# Patient Record
Sex: MALE | Race: BLACK OR AFRICAN AMERICAN | NOT HISPANIC OR LATINO | URBAN - METROPOLITAN AREA
[De-identification: names, ages, dates, MRNs, and addresses within clinical notes are randomized per-mention and may not be internally consistent; named-entity substitution may affect disease eponyms.]

---

## 2024-06-26 ENCOUNTER — HOSPITAL ENCOUNTER (EMERGENCY)
Facility: HOSPITAL | Age: 29
Discharge: HOME | End: 2024-06-26
Payer: COMMERCIAL

## 2024-06-26 VITALS
HEART RATE: 88 BPM | DIASTOLIC BLOOD PRESSURE: 80 MMHG | BODY MASS INDEX: 30.31 KG/M2 | OXYGEN SATURATION: 97 % | TEMPERATURE: 97.8 F | HEIGHT: 68 IN | SYSTOLIC BLOOD PRESSURE: 120 MMHG | WEIGHT: 200 LBS | RESPIRATION RATE: 17 BRPM

## 2024-06-26 DIAGNOSIS — M54.6 ACUTE RIGHT-SIDED THORACIC BACK PAIN: Primary | ICD-10-CM

## 2024-06-26 PROCEDURE — 99283 EMERGENCY DEPT VISIT LOW MDM: CPT

## 2024-06-26 PROCEDURE — 96372 THER/PROPH/DIAG INJ SC/IM: CPT

## 2024-06-26 PROCEDURE — 99284 EMERGENCY DEPT VISIT MOD MDM: CPT

## 2024-06-26 PROCEDURE — 2500000004 HC RX 250 GENERAL PHARMACY W/ HCPCS (ALT 636 FOR OP/ED)

## 2024-06-26 PROCEDURE — 2500000005 HC RX 250 GENERAL PHARMACY W/O HCPCS

## 2024-06-26 RX ORDER — LIDOCAINE 560 MG/1
1 PATCH PERCUTANEOUS; TOPICAL; TRANSDERMAL DAILY
Status: DISCONTINUED | OUTPATIENT
Start: 2024-06-26 | End: 2024-06-26 | Stop reason: HOSPADM

## 2024-06-26 RX ORDER — KETOROLAC TROMETHAMINE 30 MG/ML
30 INJECTION, SOLUTION INTRAMUSCULAR; INTRAVENOUS ONCE
Status: COMPLETED | OUTPATIENT
Start: 2024-06-26 | End: 2024-06-26

## 2024-06-26 RX ORDER — METHOCARBAMOL 500 MG/1
500 TABLET, FILM COATED ORAL 2 TIMES DAILY
Qty: 20 TABLET | Refills: 0 | Status: SHIPPED | OUTPATIENT
Start: 2024-06-26 | End: 2024-07-06

## 2024-06-26 RX ORDER — IBUPROFEN 600 MG/1
600 TABLET ORAL EVERY 6 HOURS PRN
Qty: 30 TABLET | Refills: 0 | Status: SHIPPED | OUTPATIENT
Start: 2024-06-26 | End: 2024-07-06

## 2024-06-26 RX ORDER — ORPHENADRINE CITRATE 30 MG/ML
60 INJECTION INTRAMUSCULAR; INTRAVENOUS ONCE
Status: COMPLETED | OUTPATIENT
Start: 2024-06-26 | End: 2024-06-26

## 2024-06-26 RX ORDER — LIDOCAINE 50 MG/G
1 PATCH TOPICAL DAILY
Qty: 10 PATCH | Refills: 0 | Status: SHIPPED | OUTPATIENT
Start: 2024-06-26 | End: 2024-07-06

## 2024-06-26 ASSESSMENT — COLUMBIA-SUICIDE SEVERITY RATING SCALE - C-SSRS
1. IN THE PAST MONTH, HAVE YOU WISHED YOU WERE DEAD OR WISHED YOU COULD GO TO SLEEP AND NOT WAKE UP?: NO
6. HAVE YOU EVER DONE ANYTHING, STARTED TO DO ANYTHING, OR PREPARED TO DO ANYTHING TO END YOUR LIFE?: NO
2. HAVE YOU ACTUALLY HAD ANY THOUGHTS OF KILLING YOURSELF?: NO

## 2024-06-26 ASSESSMENT — PAIN DESCRIPTION - PAIN TYPE: TYPE: ACUTE PAIN

## 2024-06-26 ASSESSMENT — PAIN DESCRIPTION - LOCATION: LOCATION: BACK

## 2024-06-26 ASSESSMENT — PAIN SCALES - GENERAL: PAINLEVEL_OUTOF10: 5 - MODERATE PAIN

## 2024-06-26 ASSESSMENT — PAIN - FUNCTIONAL ASSESSMENT: PAIN_FUNCTIONAL_ASSESSMENT: 0-10

## 2024-06-27 NOTE — ED PROVIDER NOTES
HPI   Chief Complaint   Patient presents with    Back Pain       Patient is a 28-year-old male presenting to the ED with back pain.  Patient endorses pain in the right side of his mid back starting this morning.  Denies specific injury, falls, or trauma to the area.  He states the pain is about a 5/10.  He denies low back pain or red flag symptoms consisting of fever/chills, bowel/bladder dysfunction, or saddle anesthesia.  He states he is able to ambulate without issue.  Patient voices no other concerns or symptoms and denies any chest pain or shortness of breath.                [place] Coma Scale Score: 15                     Patient History   No past medical history on file.  No past surgical history on file.  No family history on file.  Social History     Tobacco Use    Smoking status: Not on file    Smokeless tobacco: Not on file   Substance Use Topics    Alcohol use: Not on file    Drug use: Not on file       Physical Exam   ED Triage Vitals [06/26/24 3427]   Temperature Heart Rate Respirations BP   36.6 °C (97.8 °F) 88 17 120/80      Pulse Ox Temp Source Heart Rate Source Patient Position   97 % Temporal Monitor Sitting      BP Location FiO2 (%)     Right arm --       Physical Exam  Vitals reviewed.   Constitutional:       General: He is not in acute distress.     Appearance: He is not ill-appearing.      Comments: Smell strongly of cigarettes   Cardiovascular:      Rate and Rhythm: Normal rate and regular rhythm.   Pulmonary:      Effort: Pulmonary effort is normal. No respiratory distress.      Comments: Wheezing and tight air movement bilaterally, likely secondary to COPD and patient's heavy cigarette usage.  Musculoskeletal:      Cervical back: Normal range of motion and neck supple. No rigidity.      Comments: TTP of the right sided thoracic paraspinal muscles.  No midline tenderness, step-offs, or deformities.  Full ROM of all extremities.  Able to ambulate.   Neurological:      General: No focal  deficit present.      Mental Status: He is alert.         ED Course & MDM   Diagnoses as of 06/26/24 2131   Acute right-sided thoracic back pain       Medical Decision Making  Patient is a 28-year-old male presenting to the ED with back pain.  History was obtained from the patient.  Endorses right-sided mid back pain beginning this morning.  No specific injury, fall, or trauma.  Denies chest pain, shortness of breath, low back pain or red flag symptoms.  On physical exam, patient is sitting up comfortably and in no apparent distress.  He does smell very strongly of cigarettes.  Pulmonary effort is normal without respiratory distress.  Wheezing and tight air movement is present bilaterally, likely secondary to patient's heavy cigarette usage.  There is TTP of the right sided thoracic paraspinal muscles.  No midline tenderness, step-offs, or deformities.  Full ROM of all extremities.  Able to ambulate.  Remainder of exam as noted above.  Vital signs stable.    Patient likely has musculoskeletal pain or a very mild right-sided thoracic strain.  Do not suspect cardiac etiology of symptoms.  He received Toradol, Norflex, and a lidocaine patch here in the ED.  No indication for imaging at this time.   Upon reassessment, patient reports significant improvement in symptoms after receiving the above medications.  He does remain stable and ready for discharge.  I wrote the patient a few prescriptions to assist with his symptoms including ibuprofen, Robaxin, and lidocaine patches.  Patient was educated on proper use of each.  He was instructed to limit strenuous exercise and heavy physical activity within the coming days to further promote improvement of his symptoms.  Patient was educated on signs and symptoms that would warrant returning to the ED.  He is agreeable and understanding to the plan and all of his questions were answered to satisfaction.  Patient was discharged from the ED in stable  condition.        Procedure  Procedures     Tana Gordillo PA-C  06/26/24 2731

## 2024-06-27 NOTE — DISCHARGE INSTRUCTIONS
Your back pain is very likely secondary to a musculoskeletal strain.  Given that it is on the right side and not the midline of your back, we did not perform any imaging.  You received a few medications for your symptoms here in the ED.  I did write you for few similar prescriptions as well.  Ibuprofen is used for pain and can be taken every 6 hours.  Robaxin is a muscle relaxer which helps with pain and spasms.  You can take 1 tablet twice daily as needed.  The lidocaine patch is the same thing you received here in the ED.  This stays on for 12 hours followed by off for 12 hours.  Please limit heavy lifting and strenuous exercise throughout the coming days.  Allow your back some time to heal.